# Patient Record
Sex: MALE | Race: WHITE | URBAN - METROPOLITAN AREA
[De-identification: names, ages, dates, MRNs, and addresses within clinical notes are randomized per-mention and may not be internally consistent; named-entity substitution may affect disease eponyms.]

---

## 2017-11-24 ENCOUNTER — OFFICE VISIT (OUTPATIENT)
Dept: FAMILY MEDICINE CLINIC | Facility: CLINIC | Age: 22
End: 2017-11-24

## 2017-11-24 VITALS
BODY MASS INDEX: 31.73 KG/M2 | OXYGEN SATURATION: 99 % | SYSTOLIC BLOOD PRESSURE: 120 MMHG | TEMPERATURE: 98 F | HEART RATE: 84 BPM | DIASTOLIC BLOOD PRESSURE: 88 MMHG | HEIGHT: 66.75 IN | RESPIRATION RATE: 16 BRPM | WEIGHT: 202.19 LBS

## 2017-11-24 DIAGNOSIS — J45.20 MILD INTERMITTENT ASTHMA WITHOUT COMPLICATION: Primary | ICD-10-CM

## 2017-11-24 DIAGNOSIS — J02.9 ST (SORE THROAT): ICD-10-CM

## 2017-11-24 PROCEDURE — 99203 OFFICE O/P NEW LOW 30 MIN: CPT | Performed by: PHYSICIAN ASSISTANT

## 2017-11-24 RX ORDER — ALBUTEROL SULFATE 90 UG/1
2 AEROSOL, METERED RESPIRATORY (INHALATION) EVERY 6 HOURS PRN
Qty: 2 INHALER | Refills: 2 | Status: SHIPPED | OUTPATIENT
Start: 2017-11-24

## 2017-11-24 NOTE — PROGRESS NOTES
Apple Hubbard is a 25year old male. HPI:   Enrique Zhang presents for asthma and for concerns over a ST with possible oral lesions. He has been well-controlled on current asthma medications, but his recent URI symptoms brought his ACT score down.  He (primary encounter diagnosis)  St (sore throat)  I advised OTC antihistamine and warm salt-water gargles as needed. ACT (18) and AAP reviewed. Hussein Mckeon will follow up in 6 months for his regular visit; sooner as needed.     No orders of the defined types

## 2018-01-05 ENCOUNTER — TELEPHONE (OUTPATIENT)
Dept: FAMILY MEDICINE CLINIC | Facility: CLINIC | Age: 23
End: 2018-01-05

## 2018-01-05 NOTE — TELEPHONE ENCOUNTER
Received medical records request from patient' mom requesting all records. Patient's chart has been pulled from storage and sent with authorization to [employer].  Contact Alicia Mckeon [phone]

## 2018-03-07 ENCOUNTER — TELEPHONE (OUTPATIENT)
Dept: FAMILY MEDICINE CLINIC | Facility: CLINIC | Age: 23
End: 2018-03-07

## 2018-03-07 NOTE — TELEPHONE ENCOUNTER
Patient's mom wants to know when schools starting requiring the meningococcal vaccine.  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

## (undated) NOTE — LETTER
ASTHMA ACTION PLAN for Heidi Lilly     : 1995     Date: 2017  Provider:  Michel Jackson PA-C  Phone for doctor or clinic: [facility], [address] [place] [address], [address] [place] [address], [address] [place] [address] [phone]